# Patient Record
Sex: MALE | Race: OTHER
[De-identification: names, ages, dates, MRNs, and addresses within clinical notes are randomized per-mention and may not be internally consistent; named-entity substitution may affect disease eponyms.]

---
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
## 2025-08-19 ENCOUNTER — APPOINTMENT (OUTPATIENT)
Age: 20
End: 2025-08-19